# Patient Record
Sex: MALE | URBAN - METROPOLITAN AREA
[De-identification: names, ages, dates, MRNs, and addresses within clinical notes are randomized per-mention and may not be internally consistent; named-entity substitution may affect disease eponyms.]

---

## 2018-04-30 ENCOUNTER — APPOINTMENT (RX ONLY)
Dept: URBAN - METROPOLITAN AREA OTHER 12 | Facility: OTHER | Age: 58
Setting detail: DERMATOLOGY
End: 2018-04-30

## 2018-04-30 DIAGNOSIS — L23.9 ALLERGIC CONTACT DERMATITIS, UNSPECIFIED CAUSE: ICD-10-CM

## 2018-04-30 PROBLEM — L30.9 DERMATITIS, UNSPECIFIED: Status: ACTIVE | Noted: 2018-04-30

## 2018-04-30 PROCEDURE — ? COUNSELING

## 2018-04-30 PROCEDURE — ? PRESCRIPTION

## 2018-04-30 PROCEDURE — 99202 OFFICE O/P NEW SF 15 MIN: CPT

## 2018-04-30 RX ORDER — TRIAMCINOLONE ACETONIDE 1 MG/G
OINTMENT TOPICAL
Qty: 1 | Refills: 1 | Status: CANCELLED

## 2018-04-30 ASSESSMENT — LOCATION DETAILED DESCRIPTION DERM
LOCATION DETAILED: LEFT BUTTOCK
LOCATION DETAILED: RIGHT BUTTOCK

## 2018-04-30 ASSESSMENT — LOCATION ZONE DERM: LOCATION ZONE: TRUNK

## 2018-04-30 ASSESSMENT — LOCATION SIMPLE DESCRIPTION DERM
LOCATION SIMPLE: LEFT BUTTOCK
LOCATION SIMPLE: RIGHT BUTTOCK

## 2018-04-30 NOTE — HPI: RASH
Is This A New Presentation, Or A Follow-Up?: Rash
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 He used the outhouse with styrofoam seat, this is when the rash first appeared. He noticed the rash one morning because his buttocks was burning and blistering along the areas exposed to the seat. He has been applying Cortisone 10 which helps minimally with the symptoms.